# Patient Record
Sex: FEMALE | Race: ASIAN | NOT HISPANIC OR LATINO | Employment: FULL TIME | URBAN - METROPOLITAN AREA
[De-identification: names, ages, dates, MRNs, and addresses within clinical notes are randomized per-mention and may not be internally consistent; named-entity substitution may affect disease eponyms.]

---

## 2019-11-20 PROBLEM — M87.051: Status: ACTIVE | Noted: 2019-11-20

## 2020-03-10 ENCOUNTER — OFFICE VISIT (OUTPATIENT)
Dept: FAMILY MEDICINE | Facility: CLINIC | Age: 25
End: 2020-03-10
Payer: COMMERCIAL

## 2020-03-10 ENCOUNTER — HOSPITAL ENCOUNTER (OUTPATIENT)
Dept: RADIOLOGY | Facility: HOSPITAL | Age: 25
Discharge: HOME | End: 2020-03-10
Attending: INTERNAL MEDICINE
Payer: COMMERCIAL

## 2020-03-10 VITALS
DIASTOLIC BLOOD PRESSURE: 82 MMHG | SYSTOLIC BLOOD PRESSURE: 124 MMHG | HEART RATE: 94 BPM | BODY MASS INDEX: 28.07 KG/M2 | OXYGEN SATURATION: 98 % | HEIGHT: 59 IN | WEIGHT: 139.25 LBS

## 2020-03-10 DIAGNOSIS — L80 VITILIGO: ICD-10-CM

## 2020-03-10 DIAGNOSIS — Z30.431 IUD CHECK UP: Primary | ICD-10-CM

## 2020-03-10 DIAGNOSIS — L93.0 DISCOID LUPUS: ICD-10-CM

## 2020-03-10 DIAGNOSIS — M54.50 LOW BACK PAIN, UNSPECIFIED BACK PAIN LATERALITY, UNSPECIFIED CHRONICITY, UNSPECIFIED WHETHER SCIATICA PRESENT: ICD-10-CM

## 2020-03-10 PROCEDURE — 99203 PR OFFICE/OUTPT VISIT, NEW, LEVL III, 30-44 MIN: ICD-10-PCS | Mod: S$GLB,,, | Performed by: INTERNAL MEDICINE

## 2020-03-10 PROCEDURE — 99203 OFFICE O/P NEW LOW 30 MIN: CPT | Mod: S$GLB,,, | Performed by: INTERNAL MEDICINE

## 2020-03-10 PROCEDURE — 72100 X-RAY EXAM L-S SPINE 2/3 VWS: CPT | Mod: 26,,, | Performed by: RADIOLOGY

## 2020-03-10 PROCEDURE — 72100 X-RAY EXAM L-S SPINE 2/3 VWS: CPT | Mod: TC,FY,PO

## 2020-03-10 PROCEDURE — 99999 PR PBB SHADOW E&M-NEW PATIENT-LVL IV: CPT | Mod: PBBFAC,,, | Performed by: INTERNAL MEDICINE

## 2020-03-10 PROCEDURE — 71046 X-RAY EXAM CHEST 2 VIEWS: CPT | Mod: TC,FY,PO

## 2020-03-10 PROCEDURE — 99999 PR PBB SHADOW E&M-NEW PATIENT-LVL IV: ICD-10-PCS | Mod: PBBFAC,,, | Performed by: INTERNAL MEDICINE

## 2020-03-10 PROCEDURE — 72100 XR LUMBAR SPINE AP AND LATERAL: ICD-10-PCS | Mod: 26,,, | Performed by: RADIOLOGY

## 2020-03-10 PROCEDURE — 71046 X-RAY EXAM CHEST 2 VIEWS: CPT | Mod: 26,,, | Performed by: RADIOLOGY

## 2020-03-10 PROCEDURE — 71046 XR CHEST PA AND LATERAL: ICD-10-PCS | Mod: 26,,, | Performed by: RADIOLOGY

## 2020-03-10 RX ORDER — NORGESTIMATE AND ETHINYL ESTRADIOL 0.25-0.035
1 KIT ORAL DAILY
COMMUNITY
End: 2020-06-08

## 2020-03-10 RX ORDER — SPIRONOLACTONE 100 MG/1
100 TABLET, FILM COATED ORAL DAILY
COMMUNITY
End: 2020-03-10 | Stop reason: SDUPTHER

## 2020-03-10 RX ORDER — SPIRONOLACTONE 100 MG/1
100 TABLET, FILM COATED ORAL DAILY
Qty: 90 TABLET | Refills: 3 | Status: SHIPPED | OUTPATIENT
Start: 2020-03-10

## 2020-03-10 RX ORDER — DAPSONE 75 MG/G
7.5 GEL TOPICAL DAILY
COMMUNITY
End: 2020-06-08

## 2020-03-10 RX ORDER — MELOXICAM 15 MG/1
15 TABLET ORAL DAILY
Qty: 20 TABLET | Refills: 0 | Status: SHIPPED | OUTPATIENT
Start: 2020-03-10

## 2020-03-10 NOTE — PROGRESS NOTES
Subjective:       Patient ID: Freya Schultz is a 25 y.o. female.    Chief Complaint: Establish Care    Pt here to get established. She just moved here from [place]. She has discoid lupus and vitiligo.  She has appt with dermatology in two weeks ([facility]).  She is taking plaquenil and spironolactone.  She is currently on ocps.  She had a pap smear last year. She has histoyr of iron deficiency anemia-- was being seen by hematology in [place].     Review of Systems   Constitutional: Negative for fatigue, fever and unexpected weight change.   HENT: Negative for congestion, postnasal drip, sinus pain and sore throat.    Eyes: Negative for visual disturbance.   Respiratory: Negative for cough, chest tightness, shortness of breath and wheezing.    Cardiovascular: Negative for chest pain, palpitations and leg swelling.   Gastrointestinal: Negative for abdominal pain, blood in stool, constipation, diarrhea, nausea and vomiting.   Endocrine: Negative for cold intolerance and heat intolerance.   Genitourinary: Negative for difficulty urinating, dysuria and frequency.   Musculoskeletal: Positive for back pain. Negative for joint swelling and myalgias.   Skin: Negative for rash.   Allergic/Immunologic: Negative for environmental allergies.   Neurological: Negative for dizziness, seizures, numbness and headaches.   Hematological: Does not bruise/bleed easily.   Psychiatric/Behavioral: Negative for agitation and sleep disturbance.       Objective:      Physical Exam   Constitutional: She is oriented to person, place, and time. She appears well-developed and well-nourished.   HENT:   Head: Normocephalic and atraumatic.   Mouth/Throat: Oropharynx is clear and moist.   Eyes: Pupils are equal, round, and reactive to light. Conjunctivae and EOM are normal.   Neck: Normal range of motion. Neck supple. No thyromegaly present.   Cardiovascular: Normal rate, regular rhythm, normal heart sounds and intact distal pulses. Exam  reveals no gallop and no friction rub.   No murmur heard.  Pulmonary/Chest: Effort normal and breath sounds normal. No respiratory distress. She has no wheezes. She has no rales.   Abdominal: Soft. Bowel sounds are normal. She exhibits no distension. There is no tenderness.   Musculoskeletal: Normal range of motion. She exhibits no edema.   Lymphadenopathy:     She has no cervical adenopathy.   Neurological: She is alert and oriented to person, place, and time. No cranial nerve deficit.   Skin: Skin is warm and dry.   Psychiatric: She has a normal mood and affect. Her behavior is normal.       Assessment:       1. IUD check up    2. Discoid lupus    3. Vitiligo        Plan:       referredto ob for possible IUD  Vitiligo/discoid lupus- on plaquenil. Has appt with derm in 3 weks. Will refer to rheum. Will get records from rheum in [place]  Iron def anemia- s/p infusinos. Will get records from hematologist  Back pain- xray, mobic

## 2020-03-17 ENCOUNTER — TELEPHONE (OUTPATIENT)
Dept: FAMILY MEDICINE | Facility: CLINIC | Age: 25
End: 2020-03-17

## 2020-03-17 ENCOUNTER — TELEPHONE (OUTPATIENT)
Dept: OBSTETRICS AND GYNECOLOGY | Facility: CLINIC | Age: 25
End: 2020-03-17

## 2020-03-17 ENCOUNTER — OFFICE VISIT (OUTPATIENT)
Dept: OBSTETRICS AND GYNECOLOGY | Facility: CLINIC | Age: 25
End: 2020-03-17
Payer: COMMERCIAL

## 2020-03-17 VITALS
SYSTOLIC BLOOD PRESSURE: 112 MMHG | BODY MASS INDEX: 28.62 KG/M2 | DIASTOLIC BLOOD PRESSURE: 64 MMHG | WEIGHT: 139.31 LBS

## 2020-03-17 DIAGNOSIS — L93.0 DISCOID LUPUS: ICD-10-CM

## 2020-03-17 DIAGNOSIS — Z30.9 ENCOUNTER FOR CONTRACEPTIVE MANAGEMENT, UNSPECIFIED TYPE: Primary | ICD-10-CM

## 2020-03-17 DIAGNOSIS — L80 VITILIGO: ICD-10-CM

## 2020-03-17 DIAGNOSIS — Z30.431 IUD CHECK UP: ICD-10-CM

## 2020-03-17 PROCEDURE — 99203 PR OFFICE/OUTPT VISIT, NEW, LEVL III, 30-44 MIN: ICD-10-PCS | Mod: S$GLB,,, | Performed by: OBSTETRICS & GYNECOLOGY

## 2020-03-17 PROCEDURE — 99999 PR PBB SHADOW E&M-EST. PATIENT-LVL III: ICD-10-PCS | Mod: PBBFAC,,, | Performed by: OBSTETRICS & GYNECOLOGY

## 2020-03-17 PROCEDURE — 99999 PR PBB SHADOW E&M-EST. PATIENT-LVL III: CPT | Mod: PBBFAC,,, | Performed by: OBSTETRICS & GYNECOLOGY

## 2020-03-17 PROCEDURE — 99203 OFFICE O/P NEW LOW 30 MIN: CPT | Mod: S$GLB,,, | Performed by: OBSTETRICS & GYNECOLOGY

## 2020-03-17 NOTE — TELEPHONE ENCOUNTER
----- Message from Sharon Muller sent at 3/17/2020  5:58 PM CDT -----  Contact: Self  Pt spoke with [employer]-19 hotline and was informed in order to get tested she will need a document from PCP stating she need testing for coronavirus.     Pt does not have any symptoms at this time.       Call back number is  [phone].

## 2020-03-17 NOTE — TELEPHONE ENCOUNTER
----- Message from Martín Weaver MD sent at 3/17/2020  4:17 PM CDT -----  Please order Nexplanon - preauth with insurance.

## 2020-03-17 NOTE — LETTER
March 17, 2020      Mallory Vigil MD  [address] [facility] [address]  [place] [address]           [facility]  [id number] [place] [address]  [facility] [address]  Phone: [phone]  Fax: [phone]          Patient: Freya Schultz   MR Number: [id number]   YOB: 1995   Date of Visit: 3/17/2020       Dear Dr. Mallory Vigil:    Thank you for referring Freya Schultz to me for evaluation. Attached you will find relevant portions of my assessment and plan of care.    If you have questions, please do not hesitate to call me. I look forward to following Freya Schultz along with you.    Sincerely,    Martín Weaver MD    Enclosure  CC:  No Recipients    If you would like to receive this communication electronically, please contact [email or web address] or [phone] to request more information on [facility] access.    For providers and/or their staff who would like to refer a patient to [facility], please contact us through our one-stop-shop provider referral line, [facility], at [phone].    If you feel you have received this communication in error or would no longer like to receive these types of communications, please e-mail [email or web address]

## 2020-03-17 NOTE — PROGRESS NOTES
Here to discuss Birth control  Options.     Acne worse on oral contraceptive pills, trouble remembering pills.     Recommended nuvaring trial on nexplanon - 15 minutes spent with patient with > 1/2 time in counseling.      Plan:  nexplanon   Discontinue oral contraceptive pills for now and see how acne does.

## 2020-03-19 NOTE — TELEPHONE ENCOUNTER
Per Dr. Vigil, No it is not.  She cannot get tested though if she has no symptoms        Called pt, she is not having symtoms. She stated she does have autoimmune disorders. I advised she would have to show symptoms to be tested and she verbally understood.

## 2020-04-08 ENCOUNTER — TELEPHONE (OUTPATIENT)
Dept: OBSTETRICS AND GYNECOLOGY | Facility: CLINIC | Age: 25
End: 2020-04-08

## 2020-04-08 NOTE — TELEPHONE ENCOUNTER
Patient called and notified Nexplanon was approved and appointment made and noted. Patient stated thanks for the call.

## 2020-05-04 ENCOUNTER — PATIENT OUTREACH (OUTPATIENT)
Dept: ADMINISTRATIVE | Facility: HOSPITAL | Age: 25
End: 2020-05-04

## 2020-05-04 NOTE — PROGRESS NOTES
Dr. Vigil's non-compliant pap report.    Chart review completed 05/04/2020.  Care Everywhere updates requested and reviewed.  Immunizations reconciled. Media reviewed.     No pap found in [facility] or [facility].  Not in Links.    Health Maintenance Due   Topic Date Due    Lipid Panel  1996    HIV Screening  01/03/2011    HPV Vaccines (1 - Female 3-dose series) 01/03/2011    TETANUS VACCINE  01/03/2014    Pneumococcal Vaccine (Highest Risk) (1 of 3 - PCV13) 01/03/2015    Pap Smear  01/03/2017

## 2020-05-06 ENCOUNTER — TELEPHONE (OUTPATIENT)
Dept: OBSTETRICS AND GYNECOLOGY | Facility: CLINIC | Age: 25
End: 2020-05-06

## 2020-05-06 NOTE — TELEPHONE ENCOUNTER
Called patient and notified they needed to refrain from sexual intercourse two weeks prior to  Insertion of Nexplanon. Patient stated they understood and thank me for calling them.

## 2020-05-19 ENCOUNTER — TELEPHONE (OUTPATIENT)
Dept: OBSTETRICS AND GYNECOLOGY | Facility: CLINIC | Age: 25
End: 2020-05-19

## 2020-05-19 NOTE — TELEPHONE ENCOUNTER
Called patient and rescheduled appointment and made appointment for patient requested date and time. Reminded patient to refrain from sexual intercourse 2 weeks prior to appointment.

## 2020-06-08 ENCOUNTER — OFFICE VISIT (OUTPATIENT)
Dept: OBSTETRICS AND GYNECOLOGY | Facility: CLINIC | Age: 25
End: 2020-06-08
Payer: COMMERCIAL

## 2020-06-08 VITALS
BODY MASS INDEX: 26.98 KG/M2 | SYSTOLIC BLOOD PRESSURE: 122 MMHG | DIASTOLIC BLOOD PRESSURE: 60 MMHG | TEMPERATURE: 97 F | WEIGHT: 133.81 LBS | HEIGHT: 59 IN

## 2020-06-08 DIAGNOSIS — Z30.017 INSERTION OF NEXPLANON: Primary | ICD-10-CM

## 2020-06-08 LAB
B-HCG UR QL: NEGATIVE
CTP QC/QA: YES

## 2020-06-08 PROCEDURE — 11981 INSERTION DRUG DLVR IMPLANT: CPT | Mod: S$GLB,,, | Performed by: OBSTETRICS & GYNECOLOGY

## 2020-06-08 PROCEDURE — 11981 PR INSERT, DRUG DELIVERY IMPLANT, BIORESORB/BIODEGR/NON-BIODEGR: ICD-10-PCS | Mod: S$GLB,,, | Performed by: OBSTETRICS & GYNECOLOGY

## 2020-06-08 PROCEDURE — 81025 POCT URINE PREGNANCY: ICD-10-PCS | Mod: S$GLB,,, | Performed by: OBSTETRICS & GYNECOLOGY

## 2020-06-08 PROCEDURE — 99999 PR PBB SHADOW E&M-EST. PATIENT-LVL III: CPT | Mod: PBBFAC,,, | Performed by: OBSTETRICS & GYNECOLOGY

## 2020-06-08 PROCEDURE — 81025 URINE PREGNANCY TEST: CPT | Mod: S$GLB,,, | Performed by: OBSTETRICS & GYNECOLOGY

## 2020-06-08 PROCEDURE — 99999 PR PBB SHADOW E&M-EST. PATIENT-LVL III: ICD-10-PCS | Mod: PBBFAC,,, | Performed by: OBSTETRICS & GYNECOLOGY

## 2020-06-08 PROCEDURE — 99499 UNLISTED E&M SERVICE: CPT | Mod: S$GLB,,, | Performed by: OBSTETRICS & GYNECOLOGY

## 2020-06-08 PROCEDURE — 99499 NO LOS: ICD-10-PCS | Mod: S$GLB,,, | Performed by: OBSTETRICS & GYNECOLOGY

## 2020-06-08 NOTE — PROGRESS NOTES
Nexplanon placement:    Patient was counseled on Risks, benefits and alternatives to implanon placement and agreed to proceed. Time out done - completed.     Procedure in detail:   Medial area left arm cleaned with betadine, marked 5cm from medial condyle. Area injected with 2% lidocaine, 3mm stab incision made 5cm medial to condyle and Nexplanon was placed in the usual fashion. Betadine cleaned and steristrips placed. Pressure dressing placed and postop care discussed. Tolerated well.   Lot# [id number]

## 2020-07-10 ENCOUNTER — TELEPHONE (OUTPATIENT)
Dept: FAMILY MEDICINE | Facility: CLINIC | Age: 25
End: 2020-07-10

## 2020-07-10 NOTE — TELEPHONE ENCOUNTER
----- Message from Radha Broderick sent at 7/10/2020  9:07 AM CDT -----  Contact: pt  Pt states that she contacted her Rheumatologist in [place] who is writing her a letter since she as autoimmunize issues to work form home to give to her boss and needing the Dr to write a letter as well,have a meeting with her boss and would need it before then ,please..[phone] (home)

## 2020-07-10 NOTE — TELEPHONE ENCOUNTER
Note needs to include that the patient has multiple autoimmune diseases with susceptibility to blood clots in lower extremities. She would like this letter in support of documentation that will also be provided by Rheumatology.

## 2020-07-14 ENCOUNTER — TELEPHONE (OUTPATIENT)
Dept: FAMILY MEDICINE | Facility: CLINIC | Age: 25
End: 2020-07-14

## 2020-07-14 NOTE — TELEPHONE ENCOUNTER
----- Message from Philip Luna sent at 7/14/2020  3:10 PM CDT -----  Regarding: pt  Type: Needs Medical Advice    Who Called:  pt    Best Call Back Number: [phone]   Additional Information: pt is following up on message from 7/10/2020 about a work note. Please call to discuss.

## 2020-07-14 NOTE — TELEPHONE ENCOUNTER
Patient is requesting a note for work. Note needs to include that the patient has multiple autoimmune diseases with susceptibility to blood clots in lower extremities. She would like this letter in support of documentation that will also be provided by Rheumatology. please advise

## 2020-07-15 NOTE — TELEPHONE ENCOUNTER
Pt states he needs a letter stating she has autoimmune deficiencies and she needs to work from home during Covid 19 pandemic.There have been cases at job and she doesn't want to take any risk. She states that her supervisor want specific details on when you think its best for her to work in population again (ex: phase 3 or 4)

## 2020-09-18 ENCOUNTER — PATIENT OUTREACH (OUTPATIENT)
Dept: ADMINISTRATIVE | Facility: HOSPITAL | Age: 25
End: 2020-09-18

## 2020-09-18 NOTE — PROGRESS NOTES
Non-compliant GAP report chart review - Chart review completed for the following HM test if overdue  (Mammogram, Colonoscopy, Cervical Cancer Screening,  Diabetic lab testing, and/or Dilated EYE EXAM)  09/18/2020    Care Everywhere and Media reports - updates requested and reviewed.        [facility] and [facility] reviewed.  Pap Smear and/or  LABS       Where was pap?      Health Maintenance Due   Topic Date Due    Hepatitis C Screening  1995    Lipid Panel  1995    HPV Vaccines (1 - 2-dose series) 01/03/2007    HIV Screening  01/03/2011    TETANUS VACCINE  01/03/2014    Pap Smear  01/03/2017    Influenza Vaccine (1) 08/01/2020

## 2020-09-18 NOTE — LETTER
September 18, 2020    Freya Schultz  [address]  [place] [address]             [facility]  [address]  [facility] [address]  Phone: [phone] Dear Freya Schultz,     In addition to helping you feel better when you are sick, Mallory Vigil MD would like to ensure you are receiving the recommended preventive health screenings.  Your [facility] chart indicates you may be overdue for a Cervical Cancer Screening.  We encourage you to schedule your annual well woman exam.      If you completed a pap smear outside of [facility],  please let me know so that I can update your health record.      If you have any issues or would like assistance in scheduling your annual well woman exam, please do not hesitate to call the number below.     Sincerely,      Radha Baker, Care Coordinator  [facility]  Phone: [phone]  Fax: [phone]

## 2021-04-06 ENCOUNTER — PATIENT MESSAGE (OUTPATIENT)
Dept: ADMINISTRATIVE | Facility: HOSPITAL | Age: 26
End: 2021-04-06

## 2021-07-07 ENCOUNTER — PATIENT MESSAGE (OUTPATIENT)
Dept: ADMINISTRATIVE | Facility: HOSPITAL | Age: 26
End: 2021-07-07

## 2022-05-31 ENCOUNTER — PATIENT MESSAGE (OUTPATIENT)
Dept: ADMINISTRATIVE | Facility: HOSPITAL | Age: 27
End: 2022-05-31
Payer: COMMERCIAL

## 2024-11-19 ENCOUNTER — PATIENT MESSAGE (OUTPATIENT)
Dept: GASTROENTEROLOGY | Facility: CLINIC | Age: 29
End: 2024-11-19
Payer: COMMERCIAL